# Patient Record
Sex: FEMALE | Race: WHITE | NOT HISPANIC OR LATINO
[De-identification: names, ages, dates, MRNs, and addresses within clinical notes are randomized per-mention and may not be internally consistent; named-entity substitution may affect disease eponyms.]

---

## 2017-10-01 ENCOUNTER — FORM ENCOUNTER (OUTPATIENT)
Age: 62
End: 2017-10-01

## 2019-09-03 ENCOUNTER — FORM ENCOUNTER (OUTPATIENT)
Age: 64
End: 2019-09-03

## 2020-01-18 ENCOUNTER — TRANSCRIPTION ENCOUNTER (OUTPATIENT)
Age: 65
End: 2020-01-18

## 2021-11-04 ENCOUNTER — NON-APPOINTMENT (OUTPATIENT)
Age: 66
End: 2021-11-04

## 2021-11-04 ENCOUNTER — APPOINTMENT (OUTPATIENT)
Dept: BREAST CENTER | Facility: CLINIC | Age: 66
End: 2021-11-04
Payer: COMMERCIAL

## 2021-11-04 VITALS — DIASTOLIC BLOOD PRESSURE: 65 MMHG | HEIGHT: 65 IN | SYSTOLIC BLOOD PRESSURE: 134 MMHG | HEART RATE: 79 BPM

## 2021-11-04 PROCEDURE — 99213 OFFICE O/P EST LOW 20 MIN: CPT

## 2022-05-18 ENCOUNTER — APPOINTMENT (OUTPATIENT)
Dept: BREAST CENTER | Facility: CLINIC | Age: 67
End: 2022-05-18
Payer: COMMERCIAL

## 2022-05-18 VITALS
WEIGHT: 134.25 LBS | BODY MASS INDEX: 22.37 KG/M2 | SYSTOLIC BLOOD PRESSURE: 120 MMHG | HEIGHT: 65 IN | DIASTOLIC BLOOD PRESSURE: 74 MMHG | HEART RATE: 82 BPM

## 2022-05-18 DIAGNOSIS — Z78.9 OTHER SPECIFIED HEALTH STATUS: ICD-10-CM

## 2022-05-18 DIAGNOSIS — N64.9 DISORDER OF BREAST, UNSPECIFIED: ICD-10-CM

## 2022-05-18 PROCEDURE — 99213 OFFICE O/P EST LOW 20 MIN: CPT

## 2022-05-18 RX ORDER — ASPIRIN 81 MG
81 TABLET, DELAYED RELEASE (ENTERIC COATED) ORAL
Refills: 0 | Status: ACTIVE | COMMUNITY

## 2023-05-23 ENCOUNTER — TRANSCRIPTION ENCOUNTER (OUTPATIENT)
Age: 68
End: 2023-05-23

## 2023-05-25 ENCOUNTER — APPOINTMENT (OUTPATIENT)
Dept: BREAST CENTER | Facility: CLINIC | Age: 68
End: 2023-05-25
Payer: COMMERCIAL

## 2023-05-25 ENCOUNTER — NON-APPOINTMENT (OUTPATIENT)
Age: 68
End: 2023-05-25

## 2023-05-25 VITALS
BODY MASS INDEX: 21.16 KG/M2 | HEART RATE: 60 BPM | HEIGHT: 65 IN | DIASTOLIC BLOOD PRESSURE: 75 MMHG | SYSTOLIC BLOOD PRESSURE: 122 MMHG | WEIGHT: 127 LBS

## 2023-05-25 DIAGNOSIS — Z78.9 OTHER SPECIFIED HEALTH STATUS: ICD-10-CM

## 2023-05-25 DIAGNOSIS — Z87.891 PERSONAL HISTORY OF NICOTINE DEPENDENCE: ICD-10-CM

## 2023-05-25 DIAGNOSIS — Z12.39 ENCOUNTER FOR OTHER SCREENING FOR MALIGNANT NEOPLASM OF BREAST: ICD-10-CM

## 2023-05-25 DIAGNOSIS — Z80.3 FAMILY HISTORY OF MALIGNANT NEOPLASM OF BREAST: ICD-10-CM

## 2023-05-25 PROCEDURE — 99213 OFFICE O/P EST LOW 20 MIN: CPT

## 2023-05-25 NOTE — PAST MEDICAL HISTORY
[History of Hormone Replacement Treatment] : has no history of hormone replacement treatment [FreeTextEntry6] : yes ivf [FreeTextEntry7] : yes [FreeTextEntry8] : yes

## 2023-05-25 NOTE — HISTORY OF PRESENT ILLNESS
[FreeTextEntry1] : Patient is a 68 yo F patient here for breast cancer screening. Followed for fhx of breast cancer in paternal great aunt (unknown age). Denies palpable breast mass or nipple discharge.\par \par DANIELLE Lifetime Risk- 10.2%\par \par 9/12/17: B/l MG & US- MAYITO\par 9/4/19: B/l MG & US- no evidence of malignancy.\par 5/17/21: B/L MG & US- Dense. Arterial calcs. US- WNL. BIRADS 2.\par 5/18/22: B/l MG & US- heterogenously dense. MAYITO. BI-RADS 2\par 5/25/23: B/l MG & US- heterogenously dense. [facility]ITO. BI-RADS 2

## 2023-05-25 NOTE — PHYSICAL EXAM
[de-identified] : Bilateral breast/axilla/supraclavicular area: No masses, discharge, or adenopathy

## 2023-07-31 ENCOUNTER — APPOINTMENT (OUTPATIENT)
Dept: ORTHOPEDIC SURGERY | Facility: CLINIC | Age: 68
End: 2023-07-31
Payer: COMMERCIAL

## 2023-07-31 PROCEDURE — 25600 CLTX DST RDL FX/EPHYS SEP WO: CPT | Mod: LT

## 2023-07-31 PROCEDURE — 99204 OFFICE O/P NEW MOD 45 MIN: CPT | Mod: 57

## 2023-08-04 RX ORDER — ATORVASTATIN CALCIUM 20 MG/1
20 TABLET, FILM COATED ORAL
Qty: 90 | Refills: 0 | Status: ACTIVE | COMMUNITY
Start: 2023-07-18

## 2023-08-04 RX ORDER — ATORVASTATIN CALCIUM 10 MG/1
10 TABLET, FILM COATED ORAL
Refills: 0 | Status: DISCONTINUED | COMMUNITY
End: 2023-08-04

## 2023-08-07 NOTE — HISTORY OF PRESENT ILLNESS
[de-identified] : Patient reports a fall at [facility] 7/29/23. She was evaluted in Urgent Care on 7/30/23 where xrays were taken. She presents in a wrist immobilizer. She is RHD

## 2023-08-07 NOTE — PHYSICAL EXAM
[Left] : left wrist [Outside films reviewed] : Outside films reviewed [Distal Radius] : distal radius [2___] : volarflexion 2[unfilled]/5 [4___] : grasp 4[unfilled]/5 [5___] : pinch 5[unfilled]/5 [] : good capillary refill in all fingers [FreeTextEntry8] : nondisplaced distal radius fracture [TWNoteComboBox7] : dorsiflexion 40 degrees [TWNoteComboBox4] : volarflexion 40 degrees

## 2023-08-09 ENCOUNTER — APPOINTMENT (OUTPATIENT)
Dept: ORTHOPEDIC SURGERY | Facility: CLINIC | Age: 68
End: 2023-08-09
Payer: COMMERCIAL

## 2023-08-09 PROCEDURE — WPCAM: CPT | Mod: LT,25

## 2023-08-09 PROCEDURE — 73100 X-RAY EXAM OF WRIST: CPT | Mod: LT

## 2023-08-09 PROCEDURE — 99024 POSTOP FOLLOW-UP VISIT: CPT

## 2023-08-09 NOTE — PHYSICAL EXAM
[Distal Radius] : distal radius [2___] : volarflexion 2[unfilled]/5 [4___] : grasp 4[unfilled]/5 [5___] : pinch 5[unfilled]/5 [Left] : left wrist [Outside films reviewed] : Outside films reviewed [] : no focal motor deficits [FreeTextEntry8] : nondisplaced distal radius fracture, well aligned [TWNoteComboBox7] : dorsiflexion 40 degrees [TWNoteComboBox4] : volarflexion 40 degrees

## 2023-08-23 ENCOUNTER — APPOINTMENT (OUTPATIENT)
Dept: ORTHOPEDIC SURGERY | Facility: CLINIC | Age: 68
End: 2023-08-23
Payer: COMMERCIAL

## 2023-08-23 PROCEDURE — 99024 POSTOP FOLLOW-UP VISIT: CPT

## 2023-08-23 PROCEDURE — 73100 X-RAY EXAM OF WRIST: CPT | Mod: LT

## 2023-08-23 NOTE — PHYSICAL EXAM
[] : good capillary refill in all fingers [Left] : left wrist [The fracture is in acceptable alignment. There is progression in healing seen] : The fracture is in acceptable alignment. There is progression in healing seen [FreeTextEntry3] : water proof short arm cast in good condition [FreeTextEntry8] : nondisplaced distal radius fracture, well aligned

## 2023-08-23 NOTE — HISTORY OF PRESENT ILLNESS
[de-identified] : Patient of Dr. Lacey. Presents for follow-up, attest to good cast care. States she's doing well no pain.

## 2023-08-23 NOTE — DISCUSSION/SUMMARY
[de-identified] : I reviewed patient's radiographs and discussed her condition and treatment course.  Continue current cast for 2 more weeks.  Follow up with Dr. Lacey in 2 weeks for XRS OOC and likely transition to removable brace.  Patient voiced understanding and agreement with the plan.

## 2023-09-06 ENCOUNTER — APPOINTMENT (OUTPATIENT)
Dept: ORTHOPEDIC SURGERY | Facility: CLINIC | Age: 68
End: 2023-09-06
Payer: COMMERCIAL

## 2023-09-06 PROCEDURE — 99024 POSTOP FOLLOW-UP VISIT: CPT

## 2023-09-06 PROCEDURE — 73100 X-RAY EXAM OF WRIST: CPT | Mod: LT

## 2023-09-06 NOTE — PHYSICAL EXAM
[Distal Radius] : distal radius [2___] : volarflexion 2[unfilled]/5 [4___] : grasp 4[unfilled]/5 [5___] : pinch 5[unfilled]/5 [Left] : left wrist [] : no focal motor deficits [The fracture is in acceptable alignment. There is progression in healing seen] : The fracture is in acceptable alignment. There is progression in healing seen [FreeTextEntry3] : cast removed without incident, patient placed in wrist immobilizer (self provided), instructions provided on brace use and care. [FreeTextEntry8] : significant callus [TWNoteComboBox7] : dorsiflexion 40 degrees [TWNoteComboBox4] : volarflexion 40 degrees

## 2023-10-09 ENCOUNTER — APPOINTMENT (OUTPATIENT)
Dept: ORTHOPEDIC SURGERY | Facility: CLINIC | Age: 68
End: 2023-10-09
Payer: COMMERCIAL

## 2023-10-09 PROCEDURE — 99024 POSTOP FOLLOW-UP VISIT: CPT

## 2023-10-09 PROCEDURE — 73100 X-RAY EXAM OF WRIST: CPT | Mod: LT

## 2023-11-22 ENCOUNTER — APPOINTMENT (OUTPATIENT)
Dept: ORTHOPEDIC SURGERY | Facility: CLINIC | Age: 68
End: 2023-11-22
Payer: COMMERCIAL

## 2023-11-22 DIAGNOSIS — S66.819A STRAIN OF OTHER SPECIFIED MUSCLES, FASCIA AND TENDONS AT WRIST AND HAND LEVEL, UNSPECIFIED HAND, INITIAL ENCOUNTER: ICD-10-CM

## 2023-11-22 PROCEDURE — 99213 OFFICE O/P EST LOW 20 MIN: CPT

## 2023-12-11 ENCOUNTER — APPOINTMENT (OUTPATIENT)
Dept: MRI IMAGING | Facility: CLINIC | Age: 68
End: 2023-12-11
Payer: COMMERCIAL

## 2023-12-11 PROCEDURE — 73221 MRI JOINT UPR EXTREM W/O DYE: CPT | Mod: LT

## 2023-12-28 ENCOUNTER — APPOINTMENT (OUTPATIENT)
Dept: ORTHOPEDIC SURGERY | Facility: CLINIC | Age: 68
End: 2023-12-28
Payer: COMMERCIAL

## 2023-12-28 DIAGNOSIS — S52.532D COLLES' FRACTURE OF LEFT RADIUS, SUBSEQUENT ENCOUNTER FOR CLOSED FRACTURE WITH ROUTINE HEALING: ICD-10-CM

## 2023-12-28 DIAGNOSIS — S63.592S: ICD-10-CM

## 2023-12-28 PROCEDURE — 99214 OFFICE O/P EST MOD 30 MIN: CPT

## 2023-12-28 NOTE — DATA REVIEWED
[MRI] : MRI [Left] : left [Wrist] : wrist [I independently reviewed and interpreted images and report] : I independently reviewed and interpreted images and report

## 2023-12-28 NOTE — DISCUSSION/SUMMARY
[de-identified] : We discussed that EPL did not demonstrate tearing which was the initial concern. She does show some tearing of ulnar UFC but is nontender there and she does have some bursitis over the pisiform which may be impeding her regaining full ROM. We discussed alternating heat and ice prior to doing hand exercises. She will also proceed with having her DEXA scan done through her OB.

## 2023-12-28 NOTE — PHYSICAL EXAM
[Normal Coordination] : normal coordination [Normal Sensation] : normal sensation [Normal Mood and Affect] : normal mood and affect [Orientated] : orientated [Able to Communicate] : able to communicate [Well Developed] : well developed [Well Nourished] : well nourished [Metacarpal] : metacarpal [MP Joint] : MP joint [4___] : grasp 4[unfilled]/5 [5___] : pinch 5[unfilled]/5 [Left] : left wrist [The fracture is in acceptable alignment. There is progression in healing seen] : The fracture is in acceptable alignment. There is progression in healing seen [] : no focal motor deficits [FreeTextEntry3] : cast removed without incident, patient placed in wrist immobilizer (self provided), instructions provided on brace use and care. [de-identified] : Weakness in EPL [FreeTextEntry8] : significant callus [TWNoteComboBox7] : dorsiflexion 60 degrees [TWNoteComboBox4] : volarflexion 60 degrees

## 2024-06-20 ENCOUNTER — NON-APPOINTMENT (OUTPATIENT)
Age: 69
End: 2024-06-20

## 2024-06-20 ENCOUNTER — APPOINTMENT (OUTPATIENT)
Dept: BREAST CENTER | Facility: CLINIC | Age: 69
End: 2024-06-20
Payer: COMMERCIAL

## 2024-06-20 VITALS
HEART RATE: 60 BPM | DIASTOLIC BLOOD PRESSURE: 80 MMHG | BODY MASS INDEX: 22.1 KG/M2 | WEIGHT: 132.8 LBS | SYSTOLIC BLOOD PRESSURE: 137 MMHG

## 2024-06-20 DIAGNOSIS — R92.30 DENSE BREASTS, UNSPECIFIED: ICD-10-CM

## 2024-06-20 DIAGNOSIS — Z00.00 ENCOUNTER FOR GENERAL ADULT MEDICAL EXAMINATION W/OUT ABNORMAL FINDINGS: ICD-10-CM

## 2024-06-20 PROCEDURE — 99213 OFFICE O/P EST LOW 20 MIN: CPT

## 2024-06-20 NOTE — HISTORY OF PRESENT ILLNESS
[FreeTextEntry1] : Patient is a 67 yo F patient here for breast cancer screening. Followed for fhx of breast cancer in paternal great aunt (unknown age). Denies palpable breast mass or nipple discharge.  DANIELLE Lifetime Risk- 10.2%  9/12/17: B/l MG & US- MAYITO 9/4/19: B/l MG & US- no evidence of malignancy. 5/17/21: B/L MG & US- Dense. Arterial calcs. US- WNL. BIRADS 2. 5/18/22: B/l MG & US- heterogeneously dense. MAYITO. BI-RADS 2 5/25/23: B/l MG & US- heterogeneously dense. MAYITO. BI-RADS 2 6/20/24: B/l MG/US-heterogeneously dense.  B/L scattered benign-appearing calcs.  MAYITO.  BI-RADS 2.

## 2024-06-20 NOTE — PHYSICAL EXAM
[de-identified] : Bilateral breast/axilla/supraclavicular area: No masses, discharge, or adenopathy

## 2025-06-19 ENCOUNTER — NON-APPOINTMENT (OUTPATIENT)
Age: 70
End: 2025-06-19

## 2025-06-21 ENCOUNTER — NON-APPOINTMENT (OUTPATIENT)
Age: 70
End: 2025-06-21

## 2025-06-23 ENCOUNTER — APPOINTMENT (OUTPATIENT)
Dept: BREAST CENTER | Facility: CLINIC | Age: 70
End: 2025-06-23
Payer: COMMERCIAL

## 2025-06-23 VITALS
HEIGHT: 64.17 IN | SYSTOLIC BLOOD PRESSURE: 125 MMHG | DIASTOLIC BLOOD PRESSURE: 78 MMHG | HEART RATE: 72 BPM | WEIGHT: 132.6 LBS | BODY MASS INDEX: 22.64 KG/M2

## 2025-06-23 PROCEDURE — 99213 OFFICE O/P EST LOW 20 MIN: CPT

## 2025-06-23 RX ORDER — METFORMIN HYDROCHLORIDE 750 MG/1
TABLET ORAL
Refills: 0 | Status: ACTIVE | COMMUNITY